# Patient Record
(demographics unavailable — no encounter records)

---

## 2025-07-02 NOTE — PHYSICAL EXAM
[No Acute Distress] : no acute distress [Well-Appearing] : well-appearing [Normal Sclera/Conjunctiva] : normal sclera/conjunctiva [PERRL] : pupils equal round and reactive to light [EOMI] : extraocular movements intact [Normal Outer Ear/Nose] : the outer ears and nose were normal in appearance [Normal Oropharynx] : the oropharynx was normal [Normal TMs] : both tympanic membranes were normal [No Lymphadenopathy] : no lymphadenopathy [Supple] : supple [Thyroid Normal, No Nodules] : the thyroid was normal and there were no nodules present [No Respiratory Distress] : no respiratory distress  [Clear to Auscultation] : lungs were clear to auscultation bilaterally [Normal Rate] : normal rate  [Regular Rhythm] : with a regular rhythm [Normal S1, S2] : normal S1 and S2 [No Murmur] : no murmur heard [No Edema] : there was no peripheral edema [Soft] : abdomen soft [Non Tender] : non-tender [Non-distended] : non-distended [No Masses] : no abdominal mass palpated [No HSM] : no HSM [Normal Bowel Sounds] : normal bowel sounds [Normal Supraclavicular Nodes] : no supraclavicular lymphadenopathy [Normal Posterior Cervical Nodes] : no posterior cervical lymphadenopathy [Normal Anterior Cervical Nodes] : no anterior cervical lymphadenopathy [No Focal Deficits] : no focal deficits [Normal Gait] : normal gait [Normal Affect] : the affect was normal [Normal Mood] : the mood was normal [Normal Insight/Judgement] : insight and judgment were intact

## 2025-07-03 NOTE — ASSESSMENT
[Vaccines Reviewed] : Immunizations reviewed today. Please see immunization details in the vaccine log within the immunization flowsheet.  [FreeTextEntry1] : CPE  Up to date with PAP. No indication for vaccines at this time.  Counseling of lifestyle, continue regular exercise and balance diet, avoid marijuana.  Labs for screening and baseline.

## 2025-07-03 NOTE — HISTORY OF PRESENT ILLNESS
[de-identified] : Pt is 25yo female with non-significant PMH, who presents for CPE and establish care Pt is feeling well, takes OCPS, no supplements. No symptoms or concerns.  PAP 2/2025. Normal per patient.  Covid vaccinated. Tdap 5/2024.

## 2025-07-03 NOTE — HISTORY OF PRESENT ILLNESS
[de-identified] : Pt is 25yo female with non-significant PMH, who presents for CPE and establish care Pt is feeling well, takes OCPS, no supplements. No symptoms or concerns.  PAP 2/2025. Normal per patient.  Covid vaccinated. Tdap 5/2024.

## 2025-07-03 NOTE — HEALTH RISK ASSESSMENT
[Yes] : Yes [2 - 4 times a month (2 pts)] : 2-4 times a month (2 points) [1 or 2 (0 pts)] : 1 or 2 (0 points) [Never (0 pts)] : Never (0 points) [No] : In the past 12 months have you used drugs other than those required for medical reasons? No [0] : 2) Feeling down, depressed, or hopeless: Not at all (0) [Patient reported PAP Smear was normal] : Patient reported PAP Smear was normal [None] : None [With Significant Other] : lives with significant other [# of Members in Household ___] :  household currently consist of [unfilled] member(s) [Employed] : employed [Significant Other] : lives with significant other [Sexually Active] : sexually active [Never] : Never [Audit-CScore] : 2 [de-identified] : Marijuana occasional.  [de-identified] : Daily, running or weightlifting.  [de-identified] : Balance diet, fast food once a week.  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  [de-identified] : Never nicotine.  Smoked occasional marijuana.

## 2025-07-03 NOTE — HEALTH RISK ASSESSMENT
[Yes] : Yes [2 - 4 times a month (2 pts)] : 2-4 times a month (2 points) [1 or 2 (0 pts)] : 1 or 2 (0 points) [Never (0 pts)] : Never (0 points) [No] : In the past 12 months have you used drugs other than those required for medical reasons? No [0] : 2) Feeling down, depressed, or hopeless: Not at all (0) [Patient reported PAP Smear was normal] : Patient reported PAP Smear was normal [None] : None [With Significant Other] : lives with significant other [# of Members in Household ___] :  household currently consist of [unfilled] member(s) [Employed] : employed [Significant Other] : lives with significant other [Sexually Active] : sexually active [Never] : Never [Audit-CScore] : 2 [de-identified] : Marijuana occasional.  [de-identified] : Daily, running or weightlifting.  [de-identified] : Balance diet, fast food once a week.  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  [de-identified] : Never nicotine.  Smoked occasional marijuana.